# Patient Record
Sex: FEMALE | Race: WHITE | NOT HISPANIC OR LATINO | Employment: FULL TIME | URBAN - METROPOLITAN AREA
[De-identification: names, ages, dates, MRNs, and addresses within clinical notes are randomized per-mention and may not be internally consistent; named-entity substitution may affect disease eponyms.]

---

## 2023-02-23 LAB
ALANINE AMINOTRANSFERASE (SGPT) (U/L) IN SER/PLAS: 14 U/L (ref 7–45)
ALBUMIN (G/DL) IN SER/PLAS: 4.7 G/DL (ref 3.4–5)
ALKALINE PHOSPHATASE (U/L) IN SER/PLAS: 86 U/L (ref 33–136)
ANION GAP IN SER/PLAS: 13 MMOL/L (ref 10–20)
ASPARTATE AMINOTRANSFERASE (SGOT) (U/L) IN SER/PLAS: 22 U/L (ref 9–39)
BASOPHILS (10*3/UL) IN BLOOD BY AUTOMATED COUNT: 0.03 X10E9/L (ref 0–0.1)
BASOPHILS/100 LEUKOCYTES IN BLOOD BY AUTOMATED COUNT: 0.8 % (ref 0–2)
BILIRUBIN TOTAL (MG/DL) IN SER/PLAS: 0.5 MG/DL (ref 0–1.2)
CALCIDIOL (25 OH VITAMIN D3) (NG/ML) IN SER/PLAS: 33 NG/ML
CALCIUM (MG/DL) IN SER/PLAS: 9.5 MG/DL (ref 8.6–10.6)
CARBON DIOXIDE, TOTAL (MMOL/L) IN SER/PLAS: 28 MMOL/L (ref 21–32)
CHLORIDE (MMOL/L) IN SER/PLAS: 103 MMOL/L (ref 98–107)
CHOLESTEROL (MG/DL) IN SER/PLAS: 291 MG/DL (ref 0–199)
CHOLESTEROL IN HDL (MG/DL) IN SER/PLAS: 81.3 MG/DL
CHOLESTEROL/HDL RATIO: 3.6
COBALAMIN (VITAMIN B12) (PG/ML) IN SER/PLAS: 462 PG/ML (ref 211–911)
CREATININE (MG/DL) IN SER/PLAS: 0.85 MG/DL (ref 0.5–1.05)
EOSINOPHILS (10*3/UL) IN BLOOD BY AUTOMATED COUNT: 0.04 X10E9/L (ref 0–0.7)
EOSINOPHILS/100 LEUKOCYTES IN BLOOD BY AUTOMATED COUNT: 1.1 % (ref 0–6)
ERYTHROCYTE DISTRIBUTION WIDTH (RATIO) BY AUTOMATED COUNT: 13.2 % (ref 11.5–14.5)
ERYTHROCYTE MEAN CORPUSCULAR HEMOGLOBIN CONCENTRATION (G/DL) BY AUTOMATED: 31.1 G/DL (ref 32–36)
ERYTHROCYTE MEAN CORPUSCULAR VOLUME (FL) BY AUTOMATED COUNT: 96 FL (ref 80–100)
ERYTHROCYTES (10*6/UL) IN BLOOD BY AUTOMATED COUNT: 4.28 X10E12/L (ref 4–5.2)
GFR FEMALE: 78 ML/MIN/1.73M2
GLUCOSE (MG/DL) IN SER/PLAS: 89 MG/DL (ref 74–99)
HEMATOCRIT (%) IN BLOOD BY AUTOMATED COUNT: 41.1 % (ref 36–46)
HEMOGLOBIN (G/DL) IN BLOOD: 12.8 G/DL (ref 12–16)
IMMATURE GRANULOCYTES/100 LEUKOCYTES IN BLOOD BY AUTOMATED COUNT: 0.3 % (ref 0–0.9)
IRON (UG/DL) IN SER/PLAS: 82 UG/DL (ref 35–150)
IRON BINDING CAPACITY (UG/DL) IN SER/PLAS: 372 UG/DL (ref 240–445)
IRON SATURATION (%) IN SER/PLAS: 22 % (ref 25–45)
LDL: 198 MG/DL (ref 0–99)
LEUKOCYTES (10*3/UL) IN BLOOD BY AUTOMATED COUNT: 3.5 X10E9/L (ref 4.4–11.3)
LYMPHOCYTES (10*3/UL) IN BLOOD BY AUTOMATED COUNT: 1.5 X10E9/L (ref 1.2–4.8)
LYMPHOCYTES/100 LEUKOCYTES IN BLOOD BY AUTOMATED COUNT: 42.5 % (ref 13–44)
MONOCYTES (10*3/UL) IN BLOOD BY AUTOMATED COUNT: 0.47 X10E9/L (ref 0.1–1)
MONOCYTES/100 LEUKOCYTES IN BLOOD BY AUTOMATED COUNT: 13.3 % (ref 2–10)
NEUTROPHILS (10*3/UL) IN BLOOD BY AUTOMATED COUNT: 1.48 X10E9/L (ref 1.2–7.7)
NEUTROPHILS/100 LEUKOCYTES IN BLOOD BY AUTOMATED COUNT: 42 % (ref 40–80)
NRBC (PER 100 WBCS) BY AUTOMATED COUNT: 0 /100 WBC (ref 0–0)
PLATELETS (10*3/UL) IN BLOOD AUTOMATED COUNT: 320 X10E9/L (ref 150–450)
POTASSIUM (MMOL/L) IN SER/PLAS: 4.4 MMOL/L (ref 3.5–5.3)
PROTEIN TOTAL: 7.2 G/DL (ref 6.4–8.2)
SODIUM (MMOL/L) IN SER/PLAS: 140 MMOL/L (ref 136–145)
THYROTROPIN (MIU/L) IN SER/PLAS BY DETECTION LIMIT <= 0.05 MIU/L: 1.98 MIU/L (ref 0.44–3.98)
TRIGLYCERIDE (MG/DL) IN SER/PLAS: 61 MG/DL (ref 0–149)
UREA NITROGEN (MG/DL) IN SER/PLAS: 11 MG/DL (ref 6–23)
VLDL: 12 MG/DL (ref 0–40)

## 2023-03-07 ENCOUNTER — TELEPHONE (OUTPATIENT)
Dept: PRIMARY CARE | Facility: CLINIC | Age: 63
End: 2023-03-07

## 2023-03-07 DIAGNOSIS — E78.5 HYPERLIPIDEMIA, UNSPECIFIED HYPERLIPIDEMIA TYPE: Primary | ICD-10-CM

## 2024-02-06 DIAGNOSIS — Z12.31 ENCOUNTER FOR SCREENING MAMMOGRAM FOR MALIGNANT NEOPLASM OF BREAST: ICD-10-CM

## 2024-02-08 ENCOUNTER — LAB (OUTPATIENT)
Dept: LAB | Facility: LAB | Age: 64
End: 2024-02-08
Payer: COMMERCIAL

## 2024-02-08 ENCOUNTER — OFFICE VISIT (OUTPATIENT)
Dept: PRIMARY CARE | Facility: CLINIC | Age: 64
End: 2024-02-08
Payer: COMMERCIAL

## 2024-02-08 VITALS
BODY MASS INDEX: 22.31 KG/M2 | RESPIRATION RATE: 12 BRPM | HEART RATE: 73 BPM | WEIGHT: 130 LBS | OXYGEN SATURATION: 99 % | DIASTOLIC BLOOD PRESSURE: 71 MMHG | SYSTOLIC BLOOD PRESSURE: 111 MMHG

## 2024-02-08 DIAGNOSIS — R00.2 PALPITATION: ICD-10-CM

## 2024-02-08 DIAGNOSIS — E78.2 MIXED HYPERLIPIDEMIA: ICD-10-CM

## 2024-02-08 DIAGNOSIS — Z00.00 HEALTH CARE MAINTENANCE: ICD-10-CM

## 2024-02-08 DIAGNOSIS — R07.89 OTHER CHEST PAIN: ICD-10-CM

## 2024-02-08 DIAGNOSIS — Z00.00 HEALTH CARE MAINTENANCE: Primary | ICD-10-CM

## 2024-02-08 LAB
ANION GAP SERPL CALC-SCNC: 13 MMOL/L (ref 10–20)
BUN SERPL-MCNC: 11 MG/DL (ref 6–23)
CALCIUM SERPL-MCNC: 10.3 MG/DL (ref 8.6–10.6)
CHLORIDE SERPL-SCNC: 103 MMOL/L (ref 98–107)
CHOLEST SERPL-MCNC: 301 MG/DL (ref 0–199)
CHOLESTEROL/HDL RATIO: 3.6
CO2 SERPL-SCNC: 28 MMOL/L (ref 21–32)
CREAT SERPL-MCNC: 0.84 MG/DL (ref 0.5–1.05)
EGFRCR SERPLBLD CKD-EPI 2021: 79 ML/MIN/1.73M*2
GLUCOSE SERPL-MCNC: 86 MG/DL (ref 74–99)
HDLC SERPL-MCNC: 83.6 MG/DL
LDLC SERPL CALC-MCNC: 205 MG/DL
NON HDL CHOLESTEROL: 217 MG/DL (ref 0–149)
POTASSIUM SERPL-SCNC: 4.1 MMOL/L (ref 3.5–5.3)
SODIUM SERPL-SCNC: 140 MMOL/L (ref 136–145)
TRIGL SERPL-MCNC: 63 MG/DL (ref 0–149)
VLDL: 13 MG/DL (ref 0–40)

## 2024-02-08 PROCEDURE — 80048 BASIC METABOLIC PNL TOTAL CA: CPT

## 2024-02-08 PROCEDURE — 36415 COLL VENOUS BLD VENIPUNCTURE: CPT

## 2024-02-08 PROCEDURE — 80061 LIPID PANEL: CPT

## 2024-02-08 PROCEDURE — 93000 ELECTROCARDIOGRAM COMPLETE: CPT | Performed by: INTERNAL MEDICINE

## 2024-02-08 PROCEDURE — 99214 OFFICE O/P EST MOD 30 MIN: CPT | Performed by: INTERNAL MEDICINE

## 2024-02-08 NOTE — PROGRESS NOTES
Subjective   Patient ID: Rosita Owens is a 63 y.o. female who presents for No chief complaint on file..    HPI sick visit had while skiing in [place] she was sleeping and had for the first time send palpitation lasting perhaps 30 seconds did not feel it was irregular or fast has occurred multiple times since that time in the past week probably 3-4 times never lasting more than 1 minute not exactly associated with chest pain she thinks shortness of breath does not take cardiac medications has 2 cups of coffee per day no other over-the-counter sinus or other stimulatory medicines exercises regularly without incident    Past medical history hyperlipidemia    Medications none except for Flonase    Allergies noted and unchanged    Social history no tobacco alcohol social    Family history Brother hypertrophic cardiomyopathy    Prevention exercises regularly some prior blood work reviewed    Review of Systems    Objective   There were no vitals taken for this visit.    Physical Exam  Vital signs noted alert and oriented x 3 NCAT no JVD or bruit no lid lag no thyromegaly chest clear to auscultation and percussion CV regular rate and rhythm S1-S2 without murmur gallop rub or skip extremities no clubbing cyanosis or edema normal distal pulses     Data reviewed: 3 the day before.  Impression family history hypertrophic cardiomyopathy  Assessment/Plan   Negative for acute regularPalpitations hyperlipidemia other diagnoses chest pain atypicalchest pa  Plan check EKG advised on heart consider ambulatory heart rate monitoring and rhythm check Chem-7 advised on glucose potassium and kidney function as well as electrolytes check lipid panel advised on cholesterol cholesterol medicine good diet exercise as able while under evaluation good water consumption may follow-up with cardiology in addition her echocardiogram was reviewed normal LV size recheck based on above and with  TT 40 cc 21

## 2024-02-28 ENCOUNTER — HOSPITAL ENCOUNTER (OUTPATIENT)
Dept: RADIOLOGY | Facility: CLINIC | Age: 64
Discharge: HOME | End: 2024-02-28
Payer: COMMERCIAL

## 2024-02-28 VITALS — HEIGHT: 64 IN | BODY MASS INDEX: 22.21 KG/M2 | WEIGHT: 130.07 LBS

## 2024-02-28 DIAGNOSIS — Z12.31 ENCOUNTER FOR SCREENING MAMMOGRAM FOR MALIGNANT NEOPLASM OF BREAST: ICD-10-CM

## 2024-02-28 PROCEDURE — 77067 SCR MAMMO BI INCL CAD: CPT | Performed by: RADIOLOGY

## 2024-02-28 PROCEDURE — 77067 SCR MAMMO BI INCL CAD: CPT

## 2024-02-28 PROCEDURE — 77063 BREAST TOMOSYNTHESIS BI: CPT | Performed by: RADIOLOGY

## 2024-03-05 ENCOUNTER — OFFICE VISIT (OUTPATIENT)
Dept: PRIMARY CARE | Facility: CLINIC | Age: 64
End: 2024-03-05
Payer: COMMERCIAL

## 2024-03-05 ENCOUNTER — LAB (OUTPATIENT)
Dept: LAB | Facility: LAB | Age: 64
End: 2024-03-05
Payer: COMMERCIAL

## 2024-03-05 VITALS
DIASTOLIC BLOOD PRESSURE: 64 MMHG | WEIGHT: 136.4 LBS | BODY MASS INDEX: 23.29 KG/M2 | HEART RATE: 81 BPM | SYSTOLIC BLOOD PRESSURE: 103 MMHG | HEIGHT: 64 IN

## 2024-03-05 DIAGNOSIS — R07.89 OTHER CHEST PAIN: ICD-10-CM

## 2024-03-05 DIAGNOSIS — Z00.00 ROUTINE GENERAL MEDICAL EXAMINATION AT A HEALTH CARE FACILITY: ICD-10-CM

## 2024-03-05 DIAGNOSIS — E78.2 MIXED HYPERLIPIDEMIA: ICD-10-CM

## 2024-03-05 DIAGNOSIS — Z00.00 ROUTINE GENERAL MEDICAL EXAMINATION AT A HEALTH CARE FACILITY: Primary | ICD-10-CM

## 2024-03-05 DIAGNOSIS — R00.2 PALPITATION: ICD-10-CM

## 2024-03-05 PROBLEM — D72.819 LEUKOPENIA: Status: ACTIVE | Noted: 2024-03-05

## 2024-03-05 PROBLEM — Z85.828 HISTORY OF BASAL CELL CARCINOMA (BCC): Status: RESOLVED | Noted: 2024-03-05 | Resolved: 2024-03-05

## 2024-03-05 PROBLEM — L72.3 SEBACEOUS CYST: Status: RESOLVED | Noted: 2024-03-05 | Resolved: 2024-03-05

## 2024-03-05 PROBLEM — E78.5 HYPERLIPIDEMIA: Status: ACTIVE | Noted: 2024-03-05

## 2024-03-05 PROBLEM — H91.90 HEARING LOSS: Status: ACTIVE | Noted: 2024-03-05

## 2024-03-05 PROBLEM — R87.629 ABNORMAL PAPANICOLAOU SMEAR OF VAGINA: Status: ACTIVE | Noted: 2024-03-05

## 2024-03-05 PROBLEM — J30.9 ALLERGIC RHINITIS: Status: ACTIVE | Noted: 2024-03-05

## 2024-03-05 PROBLEM — O14.20 HEMOLYSIS, ELEVATED LIVER ENZYMES, AND LOW PLATELET (HELLP) SYNDROME (HHS-HCC): Status: RESOLVED | Noted: 2024-03-05 | Resolved: 2024-03-05

## 2024-03-05 PROBLEM — Z85.820 HISTORY OF MALIGNANT MELANOMA OF SKIN: Status: ACTIVE | Noted: 2024-03-05

## 2024-03-05 LAB
25(OH)D3 SERPL-MCNC: 34 NG/ML (ref 30–100)
BASOPHILS # BLD AUTO: 0.02 X10*3/UL (ref 0–0.1)
BASOPHILS NFR BLD AUTO: 0.6 %
EOSINOPHIL # BLD AUTO: 0.02 X10*3/UL (ref 0–0.7)
EOSINOPHIL NFR BLD AUTO: 0.6 %
ERYTHROCYTE [DISTWIDTH] IN BLOOD BY AUTOMATED COUNT: 12.3 % (ref 11.5–14.5)
HCT VFR BLD AUTO: 40.7 % (ref 36–46)
HGB BLD-MCNC: 13.2 G/DL (ref 12–16)
IMM GRANULOCYTES # BLD AUTO: 0 X10*3/UL (ref 0–0.7)
IMM GRANULOCYTES NFR BLD AUTO: 0 % (ref 0–0.9)
IRON SATN MFR SERPL: 30 % (ref 25–45)
IRON SERPL-MCNC: 115 UG/DL (ref 35–150)
LYMPHOCYTES # BLD AUTO: 1.4 X10*3/UL (ref 1.2–4.8)
LYMPHOCYTES NFR BLD AUTO: 42.8 %
MAGNESIUM SERPL-MCNC: 2.22 MG/DL (ref 1.6–2.4)
MCH RBC QN AUTO: 31 PG (ref 26–34)
MCHC RBC AUTO-ENTMCNC: 32.4 G/DL (ref 32–36)
MCV RBC AUTO: 96 FL (ref 80–100)
MONOCYTES # BLD AUTO: 0.39 X10*3/UL (ref 0.1–1)
MONOCYTES NFR BLD AUTO: 11.9 %
NEUTROPHILS # BLD AUTO: 1.44 X10*3/UL (ref 1.2–7.7)
NEUTROPHILS NFR BLD AUTO: 44.1 %
NRBC BLD-RTO: 0 /100 WBCS (ref 0–0)
PLATELET # BLD AUTO: 263 X10*3/UL (ref 150–450)
RBC # BLD AUTO: 4.26 X10*6/UL (ref 4–5.2)
TIBC SERPL-MCNC: 389 UG/DL (ref 240–445)
TSH SERPL-ACNC: 1.58 MIU/L (ref 0.44–3.98)
UIBC SERPL-MCNC: 274 UG/DL (ref 110–370)
VIT B12 SERPL-MCNC: 401 PG/ML (ref 211–911)
WBC # BLD AUTO: 3.3 X10*3/UL (ref 4.4–11.3)

## 2024-03-05 PROCEDURE — 85025 COMPLETE CBC W/AUTO DIFF WBC: CPT

## 2024-03-05 PROCEDURE — 84443 ASSAY THYROID STIM HORMONE: CPT

## 2024-03-05 PROCEDURE — 82306 VITAMIN D 25 HYDROXY: CPT

## 2024-03-05 PROCEDURE — 83540 ASSAY OF IRON: CPT

## 2024-03-05 PROCEDURE — 83550 IRON BINDING TEST: CPT

## 2024-03-05 PROCEDURE — 83735 ASSAY OF MAGNESIUM: CPT

## 2024-03-05 PROCEDURE — 82607 VITAMIN B-12: CPT

## 2024-03-05 PROCEDURE — 36415 COLL VENOUS BLD VENIPUNCTURE: CPT

## 2024-03-05 PROCEDURE — 99396 PREV VISIT EST AGE 40-64: CPT | Performed by: INTERNAL MEDICINE

## 2024-03-05 PROCEDURE — 1036F TOBACCO NON-USER: CPT | Performed by: INTERNAL MEDICINE

## 2024-03-05 RX ORDER — FLUTICASONE PROPIONATE 50 MCG
1 SPRAY, SUSPENSION (ML) NASAL DAILY
COMMUNITY

## 2024-03-05 ASSESSMENT — ENCOUNTER SYMPTOMS
BRUISES/BLEEDS EASILY: 0
VOICE CHANGE: 0
DECREASED CONCENTRATION: 0
SINUS PAIN: 0
FEVER: 0
NAUSEA: 0
BACK PAIN: 0
DYSPHORIC MOOD: 0
DIZZINESS: 0
FATIGUE: 0
LIGHT-HEADEDNESS: 0
HEMATURIA: 0
EYE ITCHING: 0
CHILLS: 0
CHEST TIGHTNESS: 0
RHINORRHEA: 0
CONSTIPATION: 0
EYE DISCHARGE: 0
NUMBNESS: 0
ADENOPATHY: 0
COUGH: 0
ACTIVITY CHANGE: 0
NECK PAIN: 0
SINUS PRESSURE: 0
PALPITATIONS: 1
HEADACHES: 0
COLOR CHANGE: 0
ABDOMINAL DISTENTION: 0
NERVOUS/ANXIOUS: 0
SORE THROAT: 0
HYPERACTIVE: 0
WEAKNESS: 0
NECK STIFFNESS: 0
SLEEP DISTURBANCE: 0
ARTHRALGIAS: 0
FREQUENCY: 0
VOMITING: 0
MYALGIAS: 0
SHORTNESS OF BREATH: 0
DIARRHEA: 0
DYSURIA: 0
DIAPHORESIS: 0
WHEEZING: 0
ABDOMINAL PAIN: 0

## 2024-03-05 NOTE — PATIENT INSTRUCTIONS
We will follow up on all comprehensive blood work once results are available and make any recommendations based on these results as may be indicated.  Please continue with routine gynecologic exams and annual mammograms.  Colonoscopy and bone density scan were updated last year, we will plan on repeating a bone density scan next year to check for any progression of bone loss.  Colonoscopy is reassuring, not needing to be repeated for total of 10 years.  Thank you for keeping up with routine vaccines.  We will consider a pneumonia vaccine at age 66 and please consider a shingles vaccine series.  Referral has been placed to a cardiology specialist.  If you have any questions or change in symptoms, please contact us.  We are happy to see you back annually.  If you do need sooner follow-up on any cholesterol-lowering medicine initiation, we are happy to proceed accordingly.  Again, please reach out with any questions or concerns.

## 2024-03-05 NOTE — PROGRESS NOTES
Rosita Owens comes in today for a comprehensive physical exam.      Ms. Owens comes in today for comprehensive physical exam.  She is feeling well but she was quite concerned with her recent cardiac symptoms.  [place] probably mildly dehydrated, and woke up in the middle of the night with significant palpitations.  She came in for evaluation, had a reassuring EKG, was recommended a Zio patch, but has not proceeded.  Her symptoms have essentially resolved with hydrating fluids.  She has had a few occasions where she has had a dyspepsia type sensation, describing this as a bubbly sensation in her mid chest.  This is not related to exercise whatsoever.  She runs regularly, skis extensively and mountain bikes, never having any chest pain or palpitations during exertion.  Cholesterol panel is significantly elevated but with outstanding HDL.  She had a CT coronary score last year which was reassuring, quite low at 50.  There is a genetic history of hyperlipidemia.  She eats very healthy and again exercises regularly.  She has been resistant to considering cholesterol-lowering therapy in the past, but understands that if this is something that would be recommended, she would need to consider this.  She denies any other symptoms at this time, specifically denying any headaches, dizziness, changes in her breathing, nausea, vomiting, nor changes in bowel or bladder habits.  Gynecologic exam is up-to-date with normal Pap smear last year.  She keeps up with routine healthcare maintenance studies.        Review of Systems   Constitutional:  Negative for activity change, chills, diaphoresis, fatigue and fever.   HENT:  Negative for congestion, ear pain, postnasal drip, rhinorrhea, sinus pressure, sinus pain, sneezing, sore throat, tinnitus and voice change.    Eyes:  Negative for discharge, itching and visual disturbance.   Respiratory:  Negative for cough, chest tightness, shortness of  breath and wheezing.    Cardiovascular:  Positive for chest pain (Nonspecific bubbly sensation, nonexertional) and palpitations. Negative for leg swelling.   Gastrointestinal:  Negative for abdominal distention, abdominal pain, constipation, diarrhea, nausea and vomiting.   Endocrine: Negative for cold intolerance, heat intolerance and polyuria.   Genitourinary:  Negative for dysuria, frequency, hematuria, urgency, vaginal bleeding and vaginal discharge.   Musculoskeletal:  Negative for arthralgias, back pain, myalgias, neck pain and neck stiffness.   Skin:  Negative for color change, pallor and rash.   Allergic/Immunologic: Negative for environmental allergies, food allergies and immunocompromised state.   Neurological:  Negative for dizziness, syncope, weakness, light-headedness, numbness and headaches.   Hematological:  Negative for adenopathy. Does not bruise/bleed easily.   Psychiatric/Behavioral:  Negative for behavioral problems, decreased concentration, dysphoric mood and sleep disturbance. The patient is not nervous/anxious and is not hyperactive.        Objective   Physical Exam  Constitutional:       General: She is not in acute distress.     Appearance: Normal appearance. She is well-developed. She is not ill-appearing or diaphoretic.   HENT:      Head: Normocephalic.      Right Ear: Tympanic membrane, ear canal and external ear normal.      Left Ear: Tympanic membrane, ear canal and external ear normal.      Nose: Nose normal. No congestion.      Mouth/Throat:      Mouth: Mucous membranes are moist.      Pharynx: Oropharynx is clear. No oropharyngeal exudate or posterior oropharyngeal erythema.   Eyes:      General: Lids are normal. No scleral icterus.     Extraocular Movements: Extraocular movements intact.      Conjunctiva/sclera: Conjunctivae normal.      Pupils: Pupils are equal, round, and reactive to light.   Neck:      Vascular: No carotid bruit.   Cardiovascular:      Rate and Rhythm: Normal  rate and regular rhythm.      Pulses: Normal pulses.      Heart sounds: No murmur heard.     No gallop.   Pulmonary:      Effort: Pulmonary effort is normal. No respiratory distress.      Breath sounds: No wheezing, rhonchi or rales.   Chest:   Breasts:     Right: No mass.      Left: Inverted nipple (Chronic) present. No mass.      Comments: Dense breast tissue bilaterally, most prominent with cystic changes in the upper outer quadrants.  Abdominal:      General: Bowel sounds are normal. There is no distension.      Palpations: Abdomen is soft. There is no mass.      Tenderness: There is no abdominal tenderness. There is no guarding or rebound.   Genitourinary:     Comments: Deferred as UTD 2023  Musculoskeletal:         General: No swelling or signs of injury.      Cervical back: Normal range of motion and neck supple. No tenderness.      Right lower leg: No edema.      Left lower leg: No edema.   Lymphadenopathy:      Cervical: No cervical adenopathy.      Upper Body:      Right upper body: No supraclavicular or axillary adenopathy.      Left upper body: No supraclavicular or axillary adenopathy.   Skin:     General: Skin is warm and dry.      Coloration: Skin is not pale.      Findings: No bruising or rash.   Neurological:      General: No focal deficit present.      Mental Status: She is alert and oriented to person, place, and time.      Cranial Nerves: No cranial nerve deficit.      Motor: No weakness.      Gait: Gait normal.   Psychiatric:         Mood and Affect: Mood normal.         Behavior: Behavior normal.         Judgment: Judgment normal.         Assessment/Plan   Full age-appropriate comprehensive physical exam and health care maintenance performed and discussed today.  Routine safety and preventative measures discussed including self breast exam, seatbelt use, no drinking and driving, no texting and driving, abstinence or cessation of tobacco use, routine dental and vision exams, healthy diet and  regular exercise.    We have discussed checking some additional lab studies, thyroid, vitamins, iron/CBC to ensure no other reason for her recent symptoms.  She is comfortable proceeding.  DEXA updated 2023 with moderate osteopenia, plan on repeat next year to check for stability or progression.  Mammogram recently updated, continue annual screening.  Pap smear up-to-date from 2023, normal results with negative HPV.  Colonoscopy up-to-date from 2023, essentially normal reassuring results, repeat 10 years total.  EKG up-to-date.  Monitor at appropriate intervals or based on symptoms.  TDaP up-to-date from 2023.  Have counseled regarding shingles vaccine series as well as annual flu shots, COVID boosters, RSV vaccine.  Pneumonia vaccine at age 66.    We have again discussed her hyperlipidemia in detail.  Her coronary calcium scoring scan did look reassuring but with slight plaque developing.  Have discussed starting on statin, she is still somewhat resistant to this.  We have discussed preventative cardiology evaluation, she is comfortable proceeding.  Referral placed.    We are happy to see her annually.  If she is on a statin and needs to be followed by us, we are happy to do so every 6 months.  She is to call with any questions or change in symptoms.  Problem List Items Addressed This Visit    None

## 2024-05-28 NOTE — PROGRESS NOTES
"Primary Care Physician: Alka Balbuena MD  Date of Visit: 2024 11:00 AM EDT      Chief Complaint:       Pt referred by Dr. Balbuena for dyslipidemia and coronary calcium score.     HPI / Summary:   Rosita Owens is a 63 y.o. female with dyslipidemia, no other significant history presents for further evaluation.  Last year was discovered to have severely elevated LDL of 198.  Repeat this year of 205.  She underwent screening coronary calcium score as below which was mildly elevated at 50.  She reports overall feeling great.  She does get an occasional heartburn-like sensation and a sharp lower substernal/epigastric discomfort that is reproducible.  Can be worse when supine as well.  Denies any change with food.  No change with exercise    Active cycler, skiing and moderate exercise.  She does get winded when goes up with very steep hill but she says \"everybody in my group is also pretty winded.\"    Says eats very healthy Mediterranean type diet    Family history of father with MI in his 70s.  Mother with dyslipidemia and CVA.      Last Cardiology Tests:  EC24    Echo 23   1. Left ventricular systolic function is normal.   2. RVSP within normal limits.       Cath:  none    Stress Test:  None    Cardiac Imagin23  Coronary artery calcium score of 50*.       Past Medical History:  Past Medical History:   Diagnosis Date    Abnormal Pap smear of cervix     Fibrocystic breast 2-3 years ago    Head injury     HELLP syndrome (HELLP), unspecified trimester ([facility])     HELLP (hemolytic anemia/elev liver enzymes/low platelets in pregnancy)    History of basal cell carcinoma (BCC) 2024    Parity 3     Personal history of malignant melanoma of skin     History of malignant melanoma of skin    Preeclampsia ([facility]CC)     Sebaceous cyst 2024        Past Surgical History:  Past Surgical History:   Procedure Laterality Date     SECTION, CLASSIC  2014     Section    SKIN CANCER " "EXCISION            Social History:  She reports that she has never smoked. She has never used smokeless tobacco. She reports that she does not use drugs. No history on file for alcohol use.    Family History:  family history includes Breast cancer in her maternal grandmother; HOCM in an other family member; lewy body dementia in her father.      Allergies:  No Known Allergies    Outpatient Medications:  Current Outpatient Medications   Medication Instructions    fluticasone (Flonase) 50 mcg/actuation nasal spray 1 spray, Each Nostril, Daily, Shake gently. Before first use, prime pump. After use, clean tip and replace cap.       Review of Systems:  A complete 10 point ROS was performed and is negative except for HPI    Physical Exam:  GENERAL: alert, cooperative, pleasant, in no acute distress  SKIN: warm, dry, no rash.  NECK: no JVD, no CADEN  CARDIAC: Regular rate and rhythm with no rubs, murmurs, or gallops  CHEST: Normal respiratory efforts, lungs clear to auscultation bilaterally.  ABDOMEN: soft, nontender, nondistended  EXTREMITIES: no edema  NEURO: Alert and oriented x 3.  Grossly normal.  Moves all 4 extremities.    Vitals:    05/29/24 1053   BP: 117/76   BP Location: Left arm   Patient Position: Sitting   Pulse: 69   SpO2: 97%   Weight: 61.1 kg (134 lb 9.6 oz)   Height: 1.626 m (5' 4\")     Wt Readings from Last 5 Encounters:   03/05/24 61.9 kg (136 lb 6.4 oz)   02/28/24 59 kg (130 lb 1.1 oz)   02/08/24 59 kg (130 lb)   01/27/23 60.3 kg (133 lb)     Body mass index is 23.1 kg/m².        Last Labs:  CMP:  Recent Labs     02/08/24  0823 02/23/23  0838    140   K 4.1 4.4    103   CO2 28 28   ANIONGAP 13 13   BUN 11 11   CREATININE 0.84 0.85   EGFR 79  --    GLUCOSE 86 89     Recent Labs     02/23/23  0838   ALBUMIN 4.7   ALKPHOS 86   ALT 14   AST 22   BILITOT 0.5     CBC:  Recent Labs     03/05/24  0920 02/23/23  0838   WBC 3.3* 3.5*   HGB 13.2 12.8   HCT 40.7 41.1    320   MCV 96 96     COAG: " "No results for input(s): \"INR\", \"DDIMERVTE\" in the last 56493 hours.  ENDO:  Recent Labs     03/05/24  0920 02/23/23  0838   TSH 1.58 1.98      CARDIAC: No results for input(s): \"CKMB\", \"TROPHS\", \"BNP\" in the last 32364 hours.    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  She does have some atypical chest pain which is probably musculoskeletal and is reproducible on exam today.    From a primary prevention standpoint she does warrant at least statin with goal LDL of at least less than 100 and ideally less than 70.  We had extensive risk-benefit discussion regarding statins today and she is agreeable to rosuvastatin 20 mg.  Side effects and anticipated cholesterol reduction were also reviewed.  We also reviewed potential need for PCSK9 inhibitors in the future.  Advised she get lipid levels completed for her children.  No advantage to fish oil and recommend she stop    Repeat lipids, CK and ALT in 6 to 8 weeks    Happy to see her in the future again as needed.          Followup Appts:  No future appointments.          ____________________________________________________________  Eusebio Pino DO  [facility] [place] [place]  [facility]  "

## 2024-05-29 ENCOUNTER — OFFICE VISIT (OUTPATIENT)
Dept: CARDIOLOGY | Facility: CLINIC | Age: 64
End: 2024-05-29
Payer: COMMERCIAL

## 2024-05-29 VITALS
SYSTOLIC BLOOD PRESSURE: 117 MMHG | DIASTOLIC BLOOD PRESSURE: 76 MMHG | OXYGEN SATURATION: 97 % | BODY MASS INDEX: 22.98 KG/M2 | HEIGHT: 64 IN | WEIGHT: 134.6 LBS | HEART RATE: 69 BPM

## 2024-05-29 DIAGNOSIS — R07.89 OTHER CHEST PAIN: ICD-10-CM

## 2024-05-29 DIAGNOSIS — R00.2 PALPITATION: ICD-10-CM

## 2024-05-29 DIAGNOSIS — E78.01 HETEROZYGOUS FAMILIAL HYPERCHOLESTEROLEMIA: Primary | ICD-10-CM

## 2024-05-29 DIAGNOSIS — R93.1 ELEVATED CORONARY ARTERY CALCIUM SCORE: ICD-10-CM

## 2024-05-29 PROCEDURE — 99214 OFFICE O/P EST MOD 30 MIN: CPT | Performed by: INTERNAL MEDICINE

## 2024-05-29 PROCEDURE — 1036F TOBACCO NON-USER: CPT | Performed by: INTERNAL MEDICINE

## 2024-05-29 PROCEDURE — 99204 OFFICE O/P NEW MOD 45 MIN: CPT | Performed by: INTERNAL MEDICINE

## 2024-05-29 RX ORDER — ROSUVASTATIN CALCIUM 20 MG/1
20 TABLET, COATED ORAL DAILY
Qty: 30 TABLET | Refills: 3 | Status: SHIPPED | OUTPATIENT
Start: 2024-05-29 | End: 2025-05-29

## 2024-05-29 RX ORDER — OMEGA-3S/DHA/EPA/FISH OIL 350-600 MG
CAPSULE ORAL
COMMUNITY
End: 2024-05-29 | Stop reason: ALTCHOICE

## 2024-05-29 ASSESSMENT — PAIN SCALES - GENERAL: PAINLEVEL: 0-NO PAIN

## 2024-05-29 NOTE — PATIENT INSTRUCTIONS
We reviewed over the long-term elevated cardiovascular risk due to the severity of her LDL levels and guidelines recommend statin therapy in these cases.  He also have evidence of some atherosclerosis of the arteries of your heart and all the more reason to get LDL at least less than 100 and ideally less than 70.  No absolute indication for aspirin.  You are doing well with healthy diet and exercise.    Recommend rosuvastatin 20 mg and we will recheck lipids and other blood work in about 6 to 8 weeks.

## 2024-08-07 ENCOUNTER — LAB (OUTPATIENT)
Dept: LAB | Facility: LAB | Age: 64
End: 2024-08-07
Payer: COMMERCIAL

## 2024-08-07 DIAGNOSIS — E78.01 HETEROZYGOUS FAMILIAL HYPERCHOLESTEROLEMIA: ICD-10-CM

## 2024-08-07 LAB
ALT SERPL W P-5'-P-CCNC: 17 U/L (ref 7–45)
CHOLEST SERPL-MCNC: 106 MG/DL (ref 0–199)
CHOLESTEROL/HDL RATIO: 2
CK SERPL-CCNC: 72 U/L (ref 0–215)
HDLC SERPL-MCNC: 54 MG/DL
LDLC SERPL CALC-MCNC: 39 MG/DL
NON HDL CHOLESTEROL: 52 MG/DL (ref 0–149)
TRIGL SERPL-MCNC: 66 MG/DL (ref 0–149)
VLDL: 13 MG/DL (ref 0–40)

## 2024-08-07 PROCEDURE — 84460 ALANINE AMINO (ALT) (SGPT): CPT

## 2024-08-07 PROCEDURE — 36415 COLL VENOUS BLD VENIPUNCTURE: CPT

## 2024-08-07 PROCEDURE — 80061 LIPID PANEL: CPT

## 2024-08-07 PROCEDURE — 82550 ASSAY OF CK (CPK): CPT

## 2024-08-09 ENCOUNTER — TELEPHONE (OUTPATIENT)
Dept: CARDIOLOGY | Facility: CLINIC | Age: 64
End: 2024-08-09
Payer: COMMERCIAL

## 2024-08-09 NOTE — TELEPHONE ENCOUNTER
TCT patient and left detailed message on self identified VM with results and instructions to call or My Chart message with any questions/concerns

## 2024-08-09 NOTE — TELEPHONE ENCOUNTER
----- Message from Eusebio Pino sent at 8/8/2024  7:38 PM EDT -----  LDL now fantastic at 39! Continue rosuvastatin. Can probably just follow up with PCP but I am happy to see in future if she would like.

## 2024-08-13 ENCOUNTER — OFFICE VISIT (OUTPATIENT)
Dept: PRIMARY CARE | Facility: CLINIC | Age: 64
End: 2024-08-13
Payer: COMMERCIAL

## 2024-08-13 ENCOUNTER — HOSPITAL ENCOUNTER (OUTPATIENT)
Dept: RADIOLOGY | Facility: HOSPITAL | Age: 64
Discharge: HOME | End: 2024-08-13
Payer: COMMERCIAL

## 2024-08-13 VITALS — DIASTOLIC BLOOD PRESSURE: 71 MMHG | SYSTOLIC BLOOD PRESSURE: 121 MMHG

## 2024-08-13 DIAGNOSIS — E78.01 HETEROZYGOUS FAMILIAL HYPERCHOLESTEROLEMIA: Primary | ICD-10-CM

## 2024-08-13 DIAGNOSIS — R10.11 RUQ ABDOMINAL PAIN: Primary | ICD-10-CM

## 2024-08-13 DIAGNOSIS — R10.11 RUQ ABDOMINAL PAIN: ICD-10-CM

## 2024-08-13 DIAGNOSIS — R19.7 DIARRHEA, UNSPECIFIED TYPE: ICD-10-CM

## 2024-08-13 DIAGNOSIS — R11.2 NAUSEA AND VOMITING, UNSPECIFIED VOMITING TYPE: ICD-10-CM

## 2024-08-13 PROCEDURE — 76705 ECHO EXAM OF ABDOMEN: CPT

## 2024-08-13 PROCEDURE — 99213 OFFICE O/P EST LOW 20 MIN: CPT | Performed by: INTERNAL MEDICINE

## 2024-08-13 PROCEDURE — 76705 ECHO EXAM OF ABDOMEN: CPT | Performed by: RADIOLOGY

## 2024-08-14 RX ORDER — ROSUVASTATIN CALCIUM 20 MG/1
20 TABLET, COATED ORAL DAILY
Qty: 90 TABLET | Refills: 1 | Status: SHIPPED | OUTPATIENT
Start: 2024-08-14 | End: 2025-08-14

## 2024-08-21 DIAGNOSIS — E78.01 HETEROZYGOUS FAMILIAL HYPERCHOLESTEROLEMIA: Primary | ICD-10-CM

## 2024-08-21 RX ORDER — ROSUVASTATIN CALCIUM 20 MG/1
20 TABLET, COATED ORAL DAILY
Qty: 30 TABLET | Refills: 0 | Status: SHIPPED | OUTPATIENT
Start: 2024-08-21 | End: 2025-08-21

## 2024-10-15 ENCOUNTER — APPOINTMENT (OUTPATIENT)
Dept: PRIMARY CARE | Facility: CLINIC | Age: 64
End: 2024-10-15
Payer: COMMERCIAL

## 2024-10-15 VITALS
WEIGHT: 129.2 LBS | DIASTOLIC BLOOD PRESSURE: 75 MMHG | HEART RATE: 83 BPM | SYSTOLIC BLOOD PRESSURE: 117 MMHG | BODY MASS INDEX: 22.18 KG/M2

## 2024-10-15 DIAGNOSIS — D72.819 LEUKOPENIA, UNSPECIFIED TYPE: ICD-10-CM

## 2024-10-15 DIAGNOSIS — K82.8 GALLBLADDER SLUDGE: Primary | ICD-10-CM

## 2024-10-15 DIAGNOSIS — R11.2 NAUSEA AND VOMITING, UNSPECIFIED VOMITING TYPE: ICD-10-CM

## 2024-10-15 PROCEDURE — 1036F TOBACCO NON-USER: CPT | Performed by: INTERNAL MEDICINE

## 2024-10-15 PROCEDURE — 99214 OFFICE O/P EST MOD 30 MIN: CPT | Performed by: INTERNAL MEDICINE

## 2024-10-15 ASSESSMENT — ENCOUNTER SYMPTOMS
DIZZINESS: 0
FEVER: 0
CHEST TIGHTNESS: 0
DIARRHEA: 0
CHILLS: 0
HEADACHES: 0
NAUSEA: 1
SHORTNESS OF BREATH: 0
PALPITATIONS: 0
COUGH: 0
ACTIVITY CHANGE: 0
WHEEZING: 0
ABDOMINAL PAIN: 1
UNEXPECTED WEIGHT CHANGE: 1
LIGHT-HEADEDNESS: 0
ABDOMINAL DISTENTION: 0
CONSTIPATION: 0
VOMITING: 1
FATIGUE: 0

## 2024-10-15 NOTE — PATIENT INSTRUCTIONS
At a time that is convenient for you, please obtain your bloodwork on a fasting basis.  We will then follow up on these results once available.  If you have any recurrence of symptoms, or if symptoms persist, I would strongly encourage surgical referral.  Please let us know if you would like to proceed at any time.  If you have any additional questions, please contact us.

## 2024-10-15 NOTE — PROGRESS NOTES
Rosita Owens comes in today for a comprehensive follow up.      Ms. Owens comes in today for follow-up on her recent gallbladder ultrasound.  This did show gallbladder sludge.  She went through about 1 month history of significant GI symptoms with nausea, vomiting, mainly postprandial.  She did not have specific diarrhea nor lighter colored stools, she does take MiraLAX on a regular basis for chronic constipation.  She lost about 10 pounds during that time, symptoms have started getting better and she has regained about 5 pounds but still eating a bland diet.  Abdominal discomfort has improved but she does have some lower pelvic cramping occasionally.  This is a dull pain that may last 30 minutes at a time occurring a few times per month.  She feels that this is ovarian in nature, but again very sporadic, could be bowel, could be gas, differential is quite wide.  She is not interested in pursuing surgical evaluation for the gallbladder sludge at this time as she is starting to feel better, but does understand that this can recur.  She has actually started cutting her rosuvastatin in half, taking just 10 mg daily, thinking that maybe this high dose of statin could have triggered the gallbladder symptoms.  She is amenable to updating lab studies but would like to do this on a fasting basis to see how the cholesterol is responding to the half dose of statin, she has been on this lower dose for about 1 month.        Review of Systems   Constitutional:  Positive for unexpected weight change (Initial 10 pound weight loss, now stabilized). Negative for activity change, chills, fatigue and fever.   Respiratory:  Negative for cough, chest tightness, shortness of breath and wheezing.    Cardiovascular:  Negative for chest pain, palpitations and leg swelling.   Gastrointestinal:  Positive for abdominal pain, nausea and vomiting. Negative for abdominal distention, constipation and diarrhea.        N/V now resolved  "  Neurological:  Negative for dizziness, light-headedness and headaches.       Objective   Physical Exam  Constitutional:       General: She is not in acute distress.     Appearance: Normal appearance. She is not diaphoretic.   Cardiovascular:      Rate and Rhythm: Normal rate.   Pulmonary:      Effort: Pulmonary effort is normal. No respiratory distress.   Abdominal:      General: Abdomen is flat. Bowel sounds are normal. There is no distension.      Palpations: Abdomen is soft. There is no mass.      Tenderness: There is no abdominal tenderness. There is no guarding or rebound.      Hernia: No hernia is present.   Skin:     Coloration: Skin is not jaundiced or pale.   Neurological:      Mental Status: She is alert.         Assessment/Plan   1.  Nausea and vomiting with gallbladder sludge: I would like to check some comprehensive labs.  I have recommended a referral to a surgical specialist, she currently declines.  If she has any worsening of symptoms or if symptoms persist, would strongly encourage doing so.  2.  Pelvic pain: She describes this as \"ovarian\".  Have explained that there could be a myriad of factors causing this, we will check labs.  Could consider pelvic ultrasound if persists or worsens.  3.  History of leukopenia: Again, update CBC.  If trending downward, can set up with hematology.    Will make follow-up recommendations once labs reviewed.  Problem List Items Addressed This Visit    None      "

## 2024-10-29 ENCOUNTER — LAB (OUTPATIENT)
Dept: LAB | Facility: LAB | Age: 64
End: 2024-10-29
Payer: COMMERCIAL

## 2024-10-29 DIAGNOSIS — K82.8 GALLBLADDER SLUDGE: ICD-10-CM

## 2024-10-29 DIAGNOSIS — D72.819 LEUKOPENIA, UNSPECIFIED TYPE: ICD-10-CM

## 2024-10-29 DIAGNOSIS — R11.2 NAUSEA AND VOMITING, UNSPECIFIED VOMITING TYPE: ICD-10-CM

## 2024-10-29 LAB
ALBUMIN SERPL BCP-MCNC: 4.6 G/DL (ref 3.4–5)
ALP SERPL-CCNC: 86 U/L (ref 33–136)
ALT SERPL W P-5'-P-CCNC: 28 U/L (ref 7–45)
AMYLASE SERPL-CCNC: 23 U/L (ref 29–103)
ANION GAP SERPL CALC-SCNC: 11 MMOL/L (ref 10–20)
AST SERPL W P-5'-P-CCNC: 34 U/L (ref 9–39)
BASOPHILS # BLD AUTO: 0.02 X10*3/UL (ref 0–0.1)
BASOPHILS NFR BLD AUTO: 0.5 %
BILIRUB SERPL-MCNC: 0.4 MG/DL (ref 0–1.2)
BUN SERPL-MCNC: 10 MG/DL (ref 6–23)
CALCIUM SERPL-MCNC: 9.4 MG/DL (ref 8.6–10.6)
CHLORIDE SERPL-SCNC: 102 MMOL/L (ref 98–107)
CHOLEST SERPL-MCNC: 177 MG/DL (ref 0–199)
CHOLESTEROL/HDL RATIO: 2.3
CK SERPL-CCNC: 100 U/L (ref 0–215)
CO2 SERPL-SCNC: 30 MMOL/L (ref 21–32)
CREAT SERPL-MCNC: 0.84 MG/DL (ref 0.5–1.05)
EGFRCR SERPLBLD CKD-EPI 2021: 79 ML/MIN/1.73M*2
EOSINOPHIL # BLD AUTO: 0.04 X10*3/UL (ref 0–0.7)
EOSINOPHIL NFR BLD AUTO: 1.1 %
ERYTHROCYTE [DISTWIDTH] IN BLOOD BY AUTOMATED COUNT: 12.8 % (ref 11.5–14.5)
GLUCOSE SERPL-MCNC: 91 MG/DL (ref 74–99)
HCT VFR BLD AUTO: 39.3 % (ref 36–46)
HDLC SERPL-MCNC: 76.7 MG/DL
HGB BLD-MCNC: 12.5 G/DL (ref 12–16)
IMM GRANULOCYTES # BLD AUTO: 0.01 X10*3/UL (ref 0–0.7)
IMM GRANULOCYTES NFR BLD AUTO: 0.3 % (ref 0–0.9)
LDLC SERPL CALC-MCNC: 88 MG/DL
LIPASE SERPL-CCNC: 53 U/L (ref 9–82)
LYMPHOCYTES # BLD AUTO: 1.3 X10*3/UL (ref 1.2–4.8)
LYMPHOCYTES NFR BLD AUTO: 34.8 %
MCH RBC QN AUTO: 31 PG (ref 26–34)
MCHC RBC AUTO-ENTMCNC: 31.8 G/DL (ref 32–36)
MCV RBC AUTO: 98 FL (ref 80–100)
MONOCYTES # BLD AUTO: 0.43 X10*3/UL (ref 0.1–1)
MONOCYTES NFR BLD AUTO: 11.5 %
NEUTROPHILS # BLD AUTO: 1.94 X10*3/UL (ref 1.2–7.7)
NEUTROPHILS NFR BLD AUTO: 51.8 %
NON HDL CHOLESTEROL: 100 MG/DL (ref 0–149)
NRBC BLD-RTO: 0 /100 WBCS (ref 0–0)
PLATELET # BLD AUTO: 259 X10*3/UL (ref 150–450)
POTASSIUM SERPL-SCNC: 4.4 MMOL/L (ref 3.5–5.3)
PROT SERPL-MCNC: 7 G/DL (ref 6.4–8.2)
RBC # BLD AUTO: 4.03 X10*6/UL (ref 4–5.2)
SODIUM SERPL-SCNC: 139 MMOL/L (ref 136–145)
TRIGL SERPL-MCNC: 60 MG/DL (ref 0–149)
VLDL: 12 MG/DL (ref 0–40)
WBC # BLD AUTO: 3.7 X10*3/UL (ref 4.4–11.3)

## 2024-10-29 PROCEDURE — 36415 COLL VENOUS BLD VENIPUNCTURE: CPT

## 2024-10-29 PROCEDURE — 80061 LIPID PANEL: CPT

## 2024-10-29 PROCEDURE — 85025 COMPLETE CBC W/AUTO DIFF WBC: CPT

## 2024-10-29 PROCEDURE — 80053 COMPREHEN METABOLIC PANEL: CPT

## 2024-10-29 PROCEDURE — 82150 ASSAY OF AMYLASE: CPT

## 2024-10-29 PROCEDURE — 83690 ASSAY OF LIPASE: CPT

## 2024-10-29 PROCEDURE — 82550 ASSAY OF CK (CPK): CPT

## 2025-01-20 DIAGNOSIS — E78.01 HETEROZYGOUS FAMILIAL HYPERCHOLESTEROLEMIA: Primary | ICD-10-CM

## 2025-01-20 RX ORDER — ROSUVASTATIN CALCIUM 10 MG/1
10 TABLET, COATED ORAL DAILY
Qty: 90 TABLET | Refills: 1 | Status: SHIPPED | OUTPATIENT
Start: 2025-01-20

## 2026-01-13 ENCOUNTER — APPOINTMENT (OUTPATIENT)
Dept: PRIMARY CARE | Facility: CLINIC | Age: 66
End: 2026-01-13
Payer: COMMERCIAL